# Patient Record
(demographics unavailable — no encounter records)

---

## 2025-04-24 NOTE — DISCUSSION/SUMMARY
[FreeTextEntry1] : JADA has a normal cardiac exam, electrocardiogram and echocardiogram. She has a mildly redundant anterior leaflet of the MV.  I reassured the patient and her  family that JADA's heart is structurally and functionally normal without any evidence of a cardiac abnormality.  I educated her to risks and harm of using THD containing products and its effect on the autonomic nervous system. All physical activities may be performed without restriction and there is no need for routine follow-up unless future concerns arise.   [Needs SBE Prophylaxis] : [unfilled] does not need bacterial endocarditis prophylaxis [PE + No Restrictions] : [unfilled] may participate in the entire physical education program without restriction, including all varsity competitive sports.

## 2025-04-24 NOTE — CARDIOLOGY SUMMARY
[Today's Date] : [unfilled] [FreeTextEntry1] : Normal sinus rhythm without preexcitation or ectopy. Heart rate (bpm): 80 [FreeTextEntry2] : 1. Redundant tissue of the anterior mitral valve leaflet is seen. 2. No mitral valve regurgitation. 3. Normal left ventricular size, morphology and systolic function. 4. Normal right ventricular morphology with qualitatively normal size and systolic function. 5. No pericardial effusion.

## 2025-04-24 NOTE — CONSULT LETTER
[Today's Date] : [unfilled] [Name] : Name: [unfilled] [] : : ~~ [Today's Date:] : [unfilled] [Consult] : I had the pleasure of evaluating your patient, [unfilled]. My full evaluation follows. [Consult - Single Provider] : Thank you very much for allowing me to participate in the care of this patient. If you have any questions, please do not hesitate to contact me. [Sincerely,] : Sincerely, [FreeTextEntry4] : no pcp [de-identified] : Chance Julian MD, FAAP, FACC  Pediatric Cardiologist  of Pediatrics [facility]

## 2025-04-24 NOTE — REASON FOR VISIT
[Initial Consultation] : an initial consultation for [Abnormal Electrocardiogram] : an abnormal EKG [Patient] : patient [Father] : father [Medical Records] : medical records [F/U - Hospitalization] : follow-up of a recent hospitalization for

## 2025-04-24 NOTE — HISTORY OF PRESENT ILLNESS
[FreeTextEntry1] : JADA is a 13 year female who presents for cardiac evaluation of a one-time episode of abnormal heart rate that was detected after she consumed a 'THC laden gummy'. She had mild chest discomfort on that day but did not experience dizziness or any other associated symptoms. She has not had a similar episode since then and has been otherwise fine. She played sports such as track and basketball prior to the incident and had no associated complaints. The discomfort subsided on the same day, and there has been no recurrence since. JADA was taken to the ER at that time and her UTOX was positive for THC. An ECG performed revealed left axis deviation so she was sent for evaluation. Of note, JADA states that she has never used THD, Marijuana, or other elicit substances.

## 2025-04-24 NOTE — CONSULT LETTER
[Today's Date] : [unfilled] [Name] : Name: [unfilled] [] : : ~~ [Today's Date:] : [unfilled] [Consult] : I had the pleasure of evaluating your patient, [unfilled]. My full evaluation follows. [Consult - Single Provider] : Thank you very much for allowing me to participate in the care of this patient. If you have any questions, please do not hesitate to contact me. [Sincerely,] : Sincerely, [FreeTextEntry4] : no pcp [de-identified] : Chance Julian MD, FAAP, FACC  Pediatric Cardiologist  of Pediatrics [facility]